# Patient Record
Sex: FEMALE | URBAN - METROPOLITAN AREA
[De-identification: names, ages, dates, MRNs, and addresses within clinical notes are randomized per-mention and may not be internally consistent; named-entity substitution may affect disease eponyms.]

---

## 2022-09-06 ENCOUNTER — APPOINTMENT (OUTPATIENT)
Age: 52
Setting detail: DERMATOLOGY
End: 2022-09-07

## 2022-09-06 DIAGNOSIS — B07.8 OTHER VIRAL WARTS: ICD-10-CM

## 2022-09-06 DIAGNOSIS — D18.0 HEMANGIOMA: ICD-10-CM

## 2022-09-06 PROBLEM — D18.01 HEMANGIOMA OF SKIN AND SUBCUTANEOUS TISSUE: Status: ACTIVE | Noted: 2022-09-06

## 2022-09-06 PROCEDURE — OTHER IMMUNOTHERAPY: CANDIDA ANTIGEN: OTHER

## 2022-09-06 PROCEDURE — 11900 INJECT SKIN LESIONS </W 7: CPT

## 2022-09-06 PROCEDURE — OTHER MIPS QUALITY: OTHER

## 2022-09-06 PROCEDURE — 99202 OFFICE O/P NEW SF 15 MIN: CPT | Mod: 25

## 2022-09-06 PROCEDURE — OTHER ADDITIONAL NOTES: OTHER

## 2022-09-06 PROCEDURE — OTHER COUNSELING: OTHER

## 2022-09-06 ASSESSMENT — LOCATION DETAILED DESCRIPTION DERM
LOCATION DETAILED: 4TH WEB SPACE RIGHT HAND
LOCATION DETAILED: NASAL SUPRATIP
LOCATION DETAILED: 4TH WEB SPACE RIGHT HAND

## 2022-09-06 ASSESSMENT — LOCATION SIMPLE DESCRIPTION DERM
LOCATION SIMPLE: RIGHT HAND
LOCATION SIMPLE: RIGHT HAND
LOCATION SIMPLE: NOSE

## 2022-09-06 ASSESSMENT — LOCATION ZONE DERM
LOCATION ZONE: HAND
LOCATION ZONE: NOSE
LOCATION ZONE: HAND

## 2022-09-06 ASSESSMENT — TOTAL NUMBER OF VERRUCA VULGARIS: # OF LESIONS?: 1

## 2022-09-06 NOTE — PROCEDURE: IMMUNOTHERAPY: CANDIDA ANTIGEN
Treatment #: 1
Render Post-Care Instructions In Note?: no
Post-Care Instructions: I reviewed with the patient in detail post-care instructions. Mild to moderate itching, tenderness, or swelling at the injection site is common and usually lasts 24 to 48 hours. The patient may elevate the painful area, apply ice for 5 minutes at a time, take tylenol every 4 to 6 hours. Only 5% of Candida immunotherapy patients get flu-like symptoms. This usually lasts only 24 to 48 hours. It is possible to prevent this at future visits by taking tylenol before the injection. If warts are on the fingers, all rings should be removed for 2 days post treatment. The patient understands that multiple treatments may be needed and there is no gaurantee of improvement.
Detail Level: Detailed
Consent was obtained from the patient. The risks of candida antigen injection were discussed in detail. Specifically, the risks of redness, itching, pain and allergic reactions were addressed. Prior to injection all of the patient's questions were answered.
Total Amount Injected In Ccs: 0.3

## 2022-09-06 NOTE — HPI: SKIN LESION
In the next few weeks you may receive a [employer] survey regarding your most recent clinic visit with us.  Please take a few moments out of your day to accurately evaluate your visit.  We strive to provide you with the best medical care. Thank you for your time and we look forward to your next visit.     Results: If you recently had testing performed and your results are normal, we will notify you of your results in a letter. If we need to contact you regarding any abnormal results, we will make 2 attempts to reach you at the number you have listed during your office visit today. If we are unable to reach you, a letter with your results and any further instructions will be mailed to your home.    Refills: If you need a refill of your medication, please contact your pharmacy FIRST. You may have refills on file with them; if not, they will contact our office to refill the prescription on your behalf.     [facility] Hours:  Monday - Thursday: 7:00 am - 6:00 pm  Friday: 7:00 am - 5:00 pm  Saturday: 7:00 am - noon    Please do not hesitate to call our office with any questions or concerns AT [phone].      
How Severe Is Your Skin Lesion?: mild
Has Your Skin Lesion Been Treated?: been treated
Is This A New Presentation, Or A Follow-Up?: Skin Lesion
When Was It Treated?: 07/01/2021

## 2022-09-06 NOTE — PROCEDURE: ADDITIONAL NOTES
Render Risk Assessment In Note?: no
Detail Level: Simple
Additional Notes: The patient has tried  cryotherapy, cantharidin, and imiquimod with no improvement
Additional Notes: Recommended consultation with Dr. Coleman to discuss electrodessication or laser treatment options.

## 2022-09-24 ENCOUNTER — RX ONLY (RX ONLY)
Age: 52
End: 2022-09-24

## 2022-09-24 RX ORDER — PREDNISONE 10 MG/1
TABLET ORAL
Qty: 18 | Refills: 0 | Status: ERX | COMMUNITY
Start: 2022-09-24

## 2022-09-27 ENCOUNTER — RX ONLY (RX ONLY)
Age: 52
End: 2022-09-27

## 2022-09-27 RX ORDER — TRIAMCINOLONE ACETONIDE 1 MG/G
CREAM TOPICAL
Qty: 453.6 | Refills: 1 | Status: ERX | COMMUNITY
Start: 2022-09-27

## 2022-09-27 RX ORDER — FAMOTIDINE 40 MG/1
TABLET, FILM COATED ORAL
Qty: 14 | Refills: 1 | Status: ERX | COMMUNITY
Start: 2022-09-27